# Patient Record
(demographics unavailable — no encounter records)

---

## 2025-02-20 NOTE — HISTORY OF PRESENT ILLNESS
[10] : 10 [2] : 2 [Radiating] : radiating [Sharp] : sharp [Sleep] : sleep [de-identified] : 2/20/25: 82 yo RHD female with right shoulder pain since Dec 2024. She reports falling at that time. She had increased pain about 2 weeks later. She has radiating pain down her arm to her forearm. There is pain into her neck. She reports difficulty raising her arm. She has pain with lifting and reaching. She tried rest, heat, ice, Motrin and Tylenol. Flexeril.   PMHx: HTN, HLD, migranes [] : no [FreeTextEntry1] : RT shoulder pain [FreeTextEntry2] : Patient fell a month ago, but no pain then 2 weeks later pain came on  [FreeTextEntry4] : a month ago [FreeTextEntry5] : At first she was black and blue [FreeTextEntry7] : neck to wrist  [FreeTextEntry9] : Pain medication,

## 2025-02-20 NOTE — ASSESSMENT
[FreeTextEntry1] : R RC tendonitis/impingement. Mild DJD. Cervical DDD. Activity modification. Ice. Trial of PT. Meloxicam 15mg QD prn. R SA injection given. RTO 6 weeks.  Procedure Note: Large Joint Injection was performed because of pain and inflammation, failure of conservative treatment.   Medications: Depo-Medrol: 1 cc, 80 mg. Lidocaine: 2 cc, 1%.  Marcaine: 2 cc, .25%.   Medication was injected in the right subacromial space. Patient has tried OTC's including aspirin, Ibuprofen, Aleve etc or prescription NSAIDs, and/or exercises at home and/ or physical therapy without satisfactory response. The risks, benefits, and alternatives to cortisone injection were explained in full to the patient. Risks outlined include but are not limited to infection, sepsis, bleeding, scarring, skin discoloration, temporary increase in pain, syncopal episode, failure to resolve symptoms, allergic reaction, symptom recurrence, and elevation of blood sugar in diabetics. Patient understood the risks. All questions were answered. After discussion of options, patient requested an injection. Oral informed consent was obtained and sterile prep of the injection site was performed using alcohol. Sterile technique was utilized for the procedure including the preparation of the solutions used for the injection. Ethyl chloride spray was used topically.  Sterile technique used. Patient tolerated procedure well. Post Procedure Instructions: Patient was advised to call if redness, pain, or fever occur and apply ice for 15 min. out of every hour for the next 12-24 hours as tolerated. patient was advised to rest the joint(s) for 2 days.  Ultrasound Guidance was used for the following reasons: for prior failure or difficult injection and to visualize tearing and inflammation. Ultrasound guided injection was performed of the shoulder, visualization of the needle and placement of injection was performed without complication.

## 2025-02-20 NOTE — IMAGING
[Disc space narrowing] : Disc space narrowing [Right] : right shoulder [Glenohumeral arthritis] : Glenohumeral arthritis

## 2025-02-20 NOTE — PHYSICAL EXAM
[Right] : right shoulder [5 ___] : forward flexion 5[unfilled]/5 [5___] : internal rotation 5[unfilled]/5 [] : positive Speed's [FreeTextEntry9] :  ER 45